# Patient Record
Sex: MALE | Race: WHITE
[De-identification: names, ages, dates, MRNs, and addresses within clinical notes are randomized per-mention and may not be internally consistent; named-entity substitution may affect disease eponyms.]

---

## 2021-06-14 ENCOUNTER — HOSPITAL ENCOUNTER (OUTPATIENT)
Dept: HOSPITAL 8 - CARD | Age: 76
Discharge: HOME | End: 2021-06-14
Attending: PSYCHIATRY & NEUROLOGY
Payer: MEDICARE

## 2021-06-14 DIAGNOSIS — G83.84: Primary | ICD-10-CM

## 2021-06-14 PROCEDURE — 95819 EEG AWAKE AND ASLEEP: CPT

## 2021-09-13 ENCOUNTER — HOSPITAL ENCOUNTER (OUTPATIENT)
Dept: HOSPITAL 8 - LAB | Age: 76
Discharge: HOME | End: 2021-09-13
Attending: INTERNAL MEDICINE
Payer: MEDICARE

## 2021-09-13 DIAGNOSIS — Z85.46: Primary | ICD-10-CM

## 2021-09-13 PROCEDURE — 84153 ASSAY OF PSA TOTAL: CPT

## 2021-09-13 PROCEDURE — 36415 COLL VENOUS BLD VENIPUNCTURE: CPT

## 2021-09-13 PROCEDURE — G0103 PSA SCREENING: HCPCS

## 2024-07-10 ENCOUNTER — APPOINTMENT (RX ONLY)
Dept: URBAN - METROPOLITAN AREA CLINIC 6 | Facility: CLINIC | Age: 79
Setting detail: DERMATOLOGY
End: 2024-07-10

## 2024-07-10 DIAGNOSIS — L57.0 ACTINIC KERATOSIS: ICD-10-CM

## 2024-07-10 DIAGNOSIS — L81.4 OTHER MELANIN HYPERPIGMENTATION: ICD-10-CM

## 2024-07-10 DIAGNOSIS — L05.91 PILONIDAL CYST WITHOUT ABSCESS: ICD-10-CM

## 2024-07-10 DIAGNOSIS — D22 MELANOCYTIC NEVI: ICD-10-CM

## 2024-07-10 DIAGNOSIS — L82.1 OTHER SEBORRHEIC KERATOSIS: ICD-10-CM

## 2024-07-10 DIAGNOSIS — D18.0 HEMANGIOMA: ICD-10-CM

## 2024-07-10 DIAGNOSIS — Z71.89 OTHER SPECIFIED COUNSELING: ICD-10-CM

## 2024-07-10 DIAGNOSIS — L85.3 XEROSIS CUTIS: ICD-10-CM

## 2024-07-10 PROBLEM — D22.5 MELANOCYTIC NEVI OF TRUNK: Status: ACTIVE | Noted: 2024-07-10

## 2024-07-10 PROBLEM — D18.01 HEMANGIOMA OF SKIN AND SUBCUTANEOUS TISSUE: Status: ACTIVE | Noted: 2024-07-10

## 2024-07-10 PROBLEM — D22.4 MELANOCYTIC NEVI OF SCALP AND NECK: Status: ACTIVE | Noted: 2024-07-10

## 2024-07-10 PROBLEM — D23.72 OTHER BENIGN NEOPLASM OF SKIN OF LEFT LOWER LIMB, INCLUDING HIP: Status: ACTIVE | Noted: 2024-07-10

## 2024-07-10 PROCEDURE — ? COUNSELING

## 2024-07-10 PROCEDURE — ? SUNSCREEN RECOMMENDATIONS

## 2024-07-10 PROCEDURE — 17000 DESTRUCT PREMALG LESION: CPT

## 2024-07-10 PROCEDURE — 99203 OFFICE O/P NEW LOW 30 MIN: CPT | Mod: 25

## 2024-07-10 PROCEDURE — ? PRESCRIPTION

## 2024-07-10 PROCEDURE — ? ADDITIONAL NOTES

## 2024-07-10 PROCEDURE — 17003 DESTRUCT PREMALG LES 2-14: CPT

## 2024-07-10 PROCEDURE — ? LIQUID NITROGEN

## 2024-07-10 RX ORDER — TRIAMCINOLONE ACETONIDE 1 MG/G
CREAM TOPICAL
Qty: 80 | Refills: 2 | Status: ERX | COMMUNITY
Start: 2024-07-10

## 2024-07-10 RX ADMIN — TRIAMCINOLONE ACETONIDE: 1 CREAM TOPICAL at 00:00

## 2024-07-10 ASSESSMENT — LOCATION SIMPLE DESCRIPTION DERM
LOCATION SIMPLE: POSTERIOR SCALP
LOCATION SIMPLE: LEFT LOWER BACK
LOCATION SIMPLE: LEFT UPPER BACK
LOCATION SIMPLE: UPPER BACK
LOCATION SIMPLE: LOWER BACK
LOCATION SIMPLE: SCALP
LOCATION SIMPLE: CHEST
LOCATION SIMPLE: LEFT FOREHEAD
LOCATION SIMPLE: ABDOMEN

## 2024-07-10 ASSESSMENT — LOCATION DETAILED DESCRIPTION DERM
LOCATION DETAILED: LEFT SUPERIOR PARIETAL SCALP
LOCATION DETAILED: LEFT SUPERIOR MEDIAL FOREHEAD
LOCATION DETAILED: RIGHT SUPERIOR PARIETAL SCALP
LOCATION DETAILED: LEFT RIB CAGE
LOCATION DETAILED: INFERIOR THORACIC SPINE
LOCATION DETAILED: LEFT CENTRAL PARIETAL SCALP
LOCATION DETAILED: LEFT SUPERIOR UPPER BACK
LOCATION DETAILED: POSTERIOR MID-PARIETAL SCALP
LOCATION DETAILED: INFERIOR LUMBAR SPINE
LOCATION DETAILED: LEFT INFERIOR MEDIAL MIDBACK
LOCATION DETAILED: LEFT LATERAL SUPERIOR CHEST

## 2024-07-10 ASSESSMENT — LOCATION ZONE DERM
LOCATION ZONE: TRUNK
LOCATION ZONE: FACE
LOCATION ZONE: SCALP

## 2024-07-10 NOTE — PROCEDURE: LIQUID NITROGEN
Show Aperture Variable?: Yes
Consent: The patient's verbal consent was obtained including but not limited to risks of crusting, scabbing, blistering, scarring, darker or lighter pigmentary change, recurrence, incomplete removal and infection.
Render Post-Care Instructions In Note?: no
Number Of Freeze-Thaw Cycles: 2 freeze-thaw cycles
Post-Care Instructions: I reviewed with the patient in detail post-care instructions. Patient is to wear sunprotection, and avoid picking at any of the treated lesions. Pt may apply Vaseline to crusted or scabbing areas.
Duration Of Freeze Thaw-Cycle (Seconds): 8
Detail Level: Zone

## 2024-07-10 NOTE — PROCEDURE: ADDITIONAL NOTES
Render Risk Assessment In Note?: no
Detail Level: Detailed
Additional Notes: Recommended frequent and liberal applications of a thick emollient such as CeraVe Moisturizing Cream with one application applied within 3-5 minutes of bathing.
Additional Notes: Discussed treatment options if it becomes bothersome/painful.

## 2025-01-23 ENCOUNTER — TELEPHONE (OUTPATIENT)
Dept: HEALTH INFORMATION MANAGEMENT | Facility: OTHER | Age: 80
End: 2025-01-23

## 2025-02-24 ENCOUNTER — OFFICE VISIT (OUTPATIENT)
Dept: MEDICAL GROUP | Facility: PHYSICIAN GROUP | Age: 80
End: 2025-02-24
Payer: MEDICARE

## 2025-02-24 VITALS
HEART RATE: 68 BPM | SYSTOLIC BLOOD PRESSURE: 118 MMHG | OXYGEN SATURATION: 94 % | BODY MASS INDEX: 29.73 KG/M2 | HEIGHT: 71 IN | DIASTOLIC BLOOD PRESSURE: 64 MMHG | WEIGHT: 212.4 LBS | TEMPERATURE: 97.9 F

## 2025-02-24 DIAGNOSIS — F13.20 SEDATIVE, HYPNOTIC OR ANXIOLYTIC DEPENDENCE, UNCOMPLICATED (HCC): ICD-10-CM

## 2025-02-24 DIAGNOSIS — E11.69 TYPE 2 DIABETES MELLITUS WITH OTHER SPECIFIED COMPLICATION, WITHOUT LONG-TERM CURRENT USE OF INSULIN (HCC): ICD-10-CM

## 2025-02-24 DIAGNOSIS — E66.9 OBESITY (BMI 30-39.9): ICD-10-CM

## 2025-02-24 PROBLEM — Z95.5 HISTORY OF CORONARY ARTERY STENT PLACEMENT: Status: ACTIVE | Noted: 2020-07-31

## 2025-02-24 PROBLEM — M51.369 DEGENERATION OF LUMBAR INTERVERTEBRAL DISC: Status: ACTIVE | Noted: 2020-07-21

## 2025-02-24 PROBLEM — M54.59 OTHER LOW BACK PAIN: Status: ACTIVE | Noted: 2025-02-24

## 2025-02-24 PROBLEM — M25.552 PAIN IN LEFT HIP: Status: ACTIVE | Noted: 2025-02-24

## 2025-02-24 PROBLEM — E78.5 HYPERLIPIDEMIA, UNSPECIFIED: Status: ACTIVE | Noted: 2025-02-24

## 2025-02-24 PROBLEM — M51.16 HERNIATION OF LUMBAR INTERVERTEBRAL DISC WITH RADICULOPATHY: Status: ACTIVE | Noted: 2020-07-21

## 2025-02-24 PROBLEM — R29.818 NEUROGENIC CLAUDICATION: Status: ACTIVE | Noted: 2020-07-21

## 2025-02-24 PROBLEM — E11.22 TYPE 2 DIABETES MELLITUS WITH DIABETIC CHRONIC KIDNEY DISEASE (HCC): Status: ACTIVE | Noted: 2025-02-24

## 2025-02-24 PROBLEM — F32.5 MAJOR DEPRESSION IN COMPLETE REMISSION (HCC): Status: ACTIVE | Noted: 2025-02-24

## 2025-02-24 PROBLEM — M10.9 GOUT, UNSPECIFIED: Chronic | Status: ACTIVE | Noted: 2021-10-05

## 2025-02-24 PROBLEM — I10 ESSENTIAL HYPERTENSION: Status: ACTIVE | Noted: 2018-05-09

## 2025-02-24 PROBLEM — I51.7 LEFT VENTRICULAR HYPERTROPHY: Chronic | Status: ACTIVE | Noted: 2021-10-05

## 2025-02-24 PROBLEM — E11.22 TYPE 2 DIABETES MELLITUS WITH DIABETIC CHRONIC KIDNEY DISEASE (HCC): Status: RESOLVED | Noted: 2025-02-24 | Resolved: 2025-02-24

## 2025-02-24 PROBLEM — M12.9 ARTHROPATHY: Status: ACTIVE | Noted: 2025-02-24

## 2025-02-24 PROBLEM — I11.9 HYPERTENSIVE HEART DISEASE: Chronic | Status: ACTIVE | Noted: 2022-03-31

## 2025-02-24 LAB
HBA1C MFR BLD: 6.9 % (ref ?–5.8)
POCT INT CON NEG: NEGATIVE
POCT INT CON POS: POSITIVE

## 2025-02-24 PROCEDURE — 3074F SYST BP LT 130 MM HG: CPT | Performed by: FAMILY MEDICINE

## 2025-02-24 PROCEDURE — 99204 OFFICE O/P NEW MOD 45 MIN: CPT | Performed by: FAMILY MEDICINE

## 2025-02-24 PROCEDURE — 3078F DIAST BP <80 MM HG: CPT | Performed by: FAMILY MEDICINE

## 2025-02-24 PROCEDURE — 83036 HEMOGLOBIN GLYCOSYLATED A1C: CPT | Performed by: FAMILY MEDICINE

## 2025-02-24 RX ORDER — SITAGLIPTIN 50 MG/1
50 TABLET ORAL
COMMUNITY
Start: 2025-01-16

## 2025-02-24 RX ORDER — ACETAMINOPHEN 500 MG
2 TABLET ORAL 3 TIMES DAILY
COMMUNITY
Start: 2025-01-03

## 2025-02-24 RX ORDER — CYCLOBENZAPRINE HCL 10 MG
TABLET ORAL
COMMUNITY

## 2025-02-24 RX ORDER — ALPRAZOLAM 0.5 MG
0.5 TABLET ORAL
COMMUNITY
Start: 2025-01-06

## 2025-02-24 RX ORDER — GLIPIZIDE 10 MG/1
TABLET ORAL
COMMUNITY

## 2025-02-24 RX ORDER — ATORVASTATIN CALCIUM 40 MG/1
TABLET, FILM COATED ORAL
COMMUNITY

## 2025-02-24 RX ORDER — NITROGLYCERIN 0.4 MG/1
0.4 TABLET SUBLINGUAL PRN
COMMUNITY

## 2025-02-24 RX ORDER — ALLOPURINOL 300 MG/1
TABLET ORAL
COMMUNITY

## 2025-02-24 RX ORDER — ASPIRIN 81 MG/1
81 TABLET ORAL DAILY
COMMUNITY

## 2025-02-24 RX ORDER — LISINOPRIL 5 MG/1
5 TABLET ORAL DAILY
COMMUNITY
End: 2025-02-24

## 2025-02-24 RX ORDER — CHLORAL HYDRATE 500 MG
1 CAPSULE ORAL
COMMUNITY

## 2025-02-24 RX ORDER — LISINOPRIL 20 MG/1
40 TABLET ORAL DAILY
COMMUNITY

## 2025-02-24 RX ORDER — CARBIDOPA AND LEVODOPA 25; 100 MG/1; MG/1
1 TABLET ORAL 3 TIMES DAILY
COMMUNITY

## 2025-02-24 RX ORDER — LIDOCAINE 50 MG/G
PATCH TOPICAL
COMMUNITY
Start: 2024-11-06

## 2025-02-24 RX ORDER — VENLAFAXINE HYDROCHLORIDE 75 MG/1
75 CAPSULE, EXTENDED RELEASE ORAL
COMMUNITY
Start: 2024-10-16

## 2025-02-24 RX ORDER — MELOXICAM 15 MG/1
15 TABLET ORAL
COMMUNITY
Start: 2024-11-06

## 2025-02-24 RX ORDER — METOPROLOL TARTRATE 25 MG/1
25 TABLET, FILM COATED ORAL 2 TIMES DAILY
COMMUNITY

## 2025-02-24 RX ORDER — CALCIUM CARBONATE 500 MG/1
500 TABLET, CHEWABLE ORAL
COMMUNITY

## 2025-02-24 ASSESSMENT — PATIENT HEALTH QUESTIONNAIRE - PHQ9: CLINICAL INTERPRETATION OF PHQ2 SCORE: 0

## 2025-02-24 ASSESSMENT — FIBROSIS 4 INDEX: FIB4 SCORE: 1.71

## 2025-02-24 NOTE — PROGRESS NOTES
Verbal consent was acquired by the patient to use [employer] ambient listening note generation during this visit.    Subjective:     HPI:   History of Present Illness  The patient is a 79-year-old male presenting for evaluation of diabetes mellitus, peripheral neuropathy, restless leg syndrome, renal cyst, chronic back pain, anxiety, onychodystrophy, and general health maintenance.    The patient has been under the care of [facility] for several years and [facility]. His last hemoglobin A1c, measured approximately 6 months ago, was 7.8%. He is currently managed on Jardiance and metformin for diabetes mellitus and has never required insulin or injectable therapies outside of hospital settings. He is under the care of an endocrinologist. The patient reports a weight loss of 40 pounds and adherence to a sugar-free diet. He undergoes annual ophthalmologic examinations, with the most recent conducted 9 months ago, and reports no changes in vision. He believes a urine protein study was performed within the past year. He has not had a podiatric examination in the past 5 years.    The patient has experienced peripheral neuropathy in both feet for the past 20 years, which he attributes to diabetes mellitus. He reports numbness without pain. He has observed a sudden change in color to blue of the toenail adjacent to the hallux, which subsequently detached this morning. He denies any trauma to the area. He reports no ankle edema but notes that his feet feel constricted in his shoes by late afternoon.    He is on carbidopa-levodopa for restless leg syndrome and is aware of its association with Parkinson's disease, although he has not been diagnosed with Parkinson's disease. He is not on pramipexole.    The patient has been diagnosed with a [facility] and has been on Phenergan for the past 6 months.    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 He has been using a cane for mobility for the past few years and finds relief from acetaminophen, which he takes every 8 hours. He also takes meloxicam and cyclobenzaprine nightly. He occasionally uses lidocaine patches and carries nitroglycerin at all times. Despite having 6 stents placed, with the most recent in 2015, he is not on clopidogrel. He is under the care of a cardiologist and is not on any opioids. He is on lisinopril for hypertension management and takes low-dose aspirin. He is not on antibiotics, gabapentin, or other neuropathic pain medications.    The patient takes venlafaxine once daily in the morning and alprazolam 0.5 mg at night, [facility] by [facility]. He has been on alprazolam since 1999, initially at a dose of 0.25 mg, which was later increased to 0.5 mg.    He is uncertain if he received his influenza vaccine this year but typically receives it [facility] through [facility]. He is up to date on his tetanus vaccination. He has an upcoming appointment with his [facility] this week. He has an echocardiogram scheduled for tomorrow and a dental appointment before his surgery with Dr. Soriano on 03/28/2025. [facility] also [facility].    SOCIAL HISTORY  He does not smoke. He does not drink alcohol.    MEDICATIONS  Current: Jardiance, metformin, carbidopa-levodopa, meloxicam, acetaminophen, cyclobenzaprine, aspirin, Xanax, venlafaxine, nitroglycerin, lisinopril, sitagliptin, multivitamin, lidocaine patches.  Discontinued: Pramipexole, glipizide, clopidogrel, gabapentin.    IMMUNIZATIONS  He is unsure if he received his influenza vaccine this year but typically gets it [facility]. He is up to date on his tetanus vaccine.    Health Maintenance: Completed    Objective:     Exam:  /64 (BP Location: Left arm, Patient Position: Sitting, BP Cuff Size: Adult)   Pulse 68   Temp 36.6 °C (97.9 °F) (Temporal)    "Ht 1.791 m (5' 10.5\")   Wt 96.3 kg (212 lb 6.4 oz)   SpO2 94%   BMI 30.05 kg/m²  Body mass index is 30.05 kg/m².    Physical Exam  Vitals reviewed.   Constitutional:       Appearance: Normal appearance.   HENT:      Head: Normocephalic and atraumatic.      Nose: Nose normal.   Cardiovascular:      Rate and Rhythm: Normal rate and regular rhythm.      Pulses: Normal pulses.      Heart sounds: Normal heart sounds. No murmur heard.  Pulmonary:      Effort: Pulmonary effort is normal.      Breath sounds: Normal breath sounds.   Abdominal:      General: Abdomen is flat.      Palpations: Abdomen is soft.   Skin:     General: Skin is warm and dry.   Neurological:      Mental Status: He is alert.   Psychiatric:         Mood and Affect: Mood normal.         Behavior: Behavior normal.             Results  Laboratory Studies  A1c was close to 8 six months ago.    Assessment & Plan:     1. Sedative, hypnotic or anxiolytic dependence, uncomplicated ([facility])        2. Type 2 diabetes mellitus with other specified complication, without long-term current use of insulin ([facility])  POCT  A1C    Diabetic Monofilament LE Exam      3. Obesity (BMI 30-39.9)  Patient identified as having weight management issue.  Appropriate orders and counseling given.          Assessment & Plan  1. Diabetes Mellitus.  His A1c levels have been fluctuating between 7 and 8 for an extended period. He is currently on Jardiance (empagliflozin) and metformin. He is also on sitagliptin 50 mg. He is advised to continue his current medication regimen and maintain a sugar-free diet. An updated A1c test will be conducted today. He is advised to monitor his blood sugar levels every 3 to 6 months. He is also advised to continue his annual ophthalmology visits due to the potential for diabetes-related eye changes. If his A1c levels exceed 8, the introduction of injectable medications may be considered.    2. Neuropathy.  He has been experiencing neuropathy in both feet " for 20 years, likely due to diabetes. He is not currently on any medication for neuropathy. Gabapentin was discussed as a potential treatment option, but he does not experience significant pain, only numbness. He is advised to continue monitoring his symptoms and report any changes.    3. Restless Leg Syndrome.  He is currently on carbidopa-levodopa for restless leg syndrome. He is not on pramipexole (Mirapex). He is advised to continue his current medication regimen.    4. Renal Cyst.  He has a small cyst on his kidney, as [facility]. He is not experiencing any pain and is not on any specific medication for it.    5. Back Pain.  He is scheduled for a third back surgery due to arthritis and other issues. He is currently taking acetaminophen and meloxicam for pain management. He also takes cyclobenzaprine (Flexeril) every night. He is advised to continue his current pain management regimen and follow up with his spine surgeon, Dr. Soriano, at [facility].    6. Anxiety.  He is taking venlafaxine once daily in the morning and Xanax 0.5 mg at night, [facility] by [facility]. He is advised to continue his current medication regimen.    7. Toenail Issue.  He reported that his toenail turned blue and eventually fell off, likely due to blood accumulation underneath the nail. He is advised that it will take about a year for the nail to grow back.    8. Health Maintenance.  He is up to date on his tetanus vaccine. He is unsure if he received his flu shot this year but usually gets it [facility]. He is advised to get his flu shot annually.    PROCEDURE  The patient had 6 stents placed previously, with the most recent one in 2015.    HCC Gap Form    Diagnosis to address: F13.20 - Sedative, hypnotic or anxiolytic dependence, uncomplicated (HCC)  Assessment and plan: Chronic, stable. Continue with current defined treatment plan: Alprazolam 0.5 mg nightly.  [facility].  Recommend continued follow-up at least  every 3 months.    Diagnosis: E11.69 - Type 2 diabetes mellitus with other specified complication (HCC)  Assessment and plan: Chronic, stable. Continue with current defined treatment plan: Jardiance-metformin  mg tablet daily as well as a Shane 50 mg tablet daily.  Continue regular diabetic screening at least annually with A1c's every 3 to 6 months.  Patient does follow [facility].  Records requested. Follow-up at least annually.  Last edited 02/24/25 11:02 PST by Anais Bach M.D.         Return in about 6 months (around 8/24/2025) for Med check.    Please note that this dictation was created using voice recognition software. I have made every reasonable attempt to correct obvious errors, but I expect that there are errors of grammar and possibly content that I did not discover before finalizing the note.    Anais Bach MD  Family Medicine and Non - Operative Sports Medicine   [facility] [place]

## 2025-02-24 NOTE — LETTER
[facility]  Anais Bach M.D.  [address]  [place] [address]  Fax: [phone]   Authorization for Release/Disclosure of   Protected Health Information   Name: LOBO SOLORIO : 1945 SSN: [id number]   Address: [address]  [place] [address] Phone:    [phone] (home)    I authorize the entity listed below to release/disclose the PHI below to:   [facility]/Anais Bach M.D. and Anais Bach M.D.   Provider or Entity Name:     Address   City, State, Zip   Phone:      Fax:     Reason for request: continuity of care   Information to be released:    [  ] LAST COLONOSCOPY,  including any PATH REPORT and follow-up  [  ] LAST FIT/COLOGUARD RESULT [  ] LAST DEXA  [  ] LAST MAMMOGRAM  [  ] LAST PAP  [  ] LAST LABS [  ] RETINA EXAM REPORT  [  ] IMMUNIZATION RECORDS  [  ] Release all info      [  ] Check here and initial the line next to each item to release ALL health information INCLUDING  _____ Care and treatment for drug and / or alcohol abuse  _____ HIV testing, infection status, or AIDS  _____ Genetic Testing    DATES OF SERVICE OR TIME PERIOD TO BE DISCLOSED: _____________  I understand and acknowledge that:  * This Authorization may be revoked at any time by you in writing, except if your health information has already been used or disclosed.  * Your health information that will be used or disclosed as a result of you signing this authorization could be re-disclosed by the recipient. If this occurs, your re-disclosed health information may no longer be protected by State or Federal laws.  * You may refuse to sign this Authorization. Your refusal will not affect your ability to obtain treatment.  * This Authorization becomes effective upon signing and will  on (date) __________.      If no date is indicated, this Authorization will  one (1) year from the signature date.    Name: Lobo Solorio  Signature: Date:   2025     PLEASE FAX REQUESTED RECORDS BACK TO: [phone]  [phone]

## 2025-03-24 ENCOUNTER — APPOINTMENT (OUTPATIENT)
Dept: CARDIOLOGY | Facility: MEDICAL CENTER | Age: 80
End: 2025-03-24
Attending: INTERNAL MEDICINE
Payer: MEDICARE

## 2025-04-11 ENCOUNTER — HOSPITAL ENCOUNTER (OUTPATIENT)
Dept: CARDIOLOGY | Facility: MEDICAL CENTER | Age: 80
End: 2025-04-11
Attending: INTERNAL MEDICINE
Payer: MEDICARE

## 2025-04-11 DIAGNOSIS — I25.10 ASCVD (ARTERIOSCLEROTIC CARDIOVASCULAR DISEASE): ICD-10-CM

## 2025-04-11 LAB
LV EJECT FRACT  99904: 56
LV EJECT FRACT MOD 2C 99903: 55.86
LV EJECT FRACT MOD 4C 99902: 59.43
LV EJECT FRACT MOD BP 99901: 55.98

## 2025-04-11 PROCEDURE — 93306 TTE W/DOPPLER COMPLETE: CPT | Mod: 26 | Performed by: INTERNAL MEDICINE

## 2025-04-11 PROCEDURE — 93306 TTE W/DOPPLER COMPLETE: CPT

## 2025-06-02 NOTE — TELEPHONE ENCOUNTER
Message: Called and left message for patient to schedule annual PATY Visit with [facility]. Left phone number for patient to call [facility] at [phone] to schedule.

## 2025-08-25 ENCOUNTER — OFFICE VISIT (OUTPATIENT)
Dept: MEDICAL GROUP | Facility: PHYSICIAN GROUP | Age: 80
End: 2025-08-25
Payer: MEDICARE

## 2025-08-25 VITALS
SYSTOLIC BLOOD PRESSURE: 126 MMHG | RESPIRATION RATE: 14 BRPM | DIASTOLIC BLOOD PRESSURE: 70 MMHG | HEART RATE: 68 BPM | BODY MASS INDEX: 29.34 KG/M2 | OXYGEN SATURATION: 93 % | TEMPERATURE: 97 F | WEIGHT: 209.6 LBS | HEIGHT: 71 IN

## 2025-08-25 DIAGNOSIS — E66.9 OBESITY (BMI 30-39.9): Primary | ICD-10-CM

## 2025-08-25 DIAGNOSIS — Z12.5 ENCOUNTER FOR SCREENING FOR MALIGNANT NEOPLASM OF PROSTATE: ICD-10-CM

## 2025-08-25 DIAGNOSIS — E11.69 TYPE 2 DIABETES MELLITUS WITH OTHER SPECIFIED COMPLICATION, WITHOUT LONG-TERM CURRENT USE OF INSULIN (HCC): ICD-10-CM

## 2025-08-25 DIAGNOSIS — M51.370 DEGENERATION OF INTERVERTEBRAL DISC OF LUMBOSACRAL REGION WITH DISCOGENIC BACK PAIN: ICD-10-CM

## 2025-08-25 DIAGNOSIS — Z11.59 NEED FOR HEPATITIS C SCREENING TEST: ICD-10-CM

## 2025-08-25 DIAGNOSIS — Z00.00 HEALTHCARE MAINTENANCE: ICD-10-CM

## 2025-08-25 PROBLEM — L05.92 PILONIDAL SINUS: Status: ACTIVE | Noted: 2021-10-05

## 2025-08-25 PROBLEM — F33.9 MAJOR DEPRESSION, RECURRENT, CHRONIC (HCC): Chronic | Status: ACTIVE | Noted: 2022-10-10

## 2025-08-25 PROBLEM — I45.10 RBBB: Status: ACTIVE | Noted: 2020-07-31

## 2025-08-25 PROBLEM — F43.12 POST-TRAUMATIC STRESS DISORDER, CHRONIC: Status: ACTIVE | Noted: 2025-08-25

## 2025-08-25 PROBLEM — M48.00 SPINAL STENOSIS: Status: ACTIVE | Noted: 2024-09-24

## 2025-08-25 PROBLEM — R80.9 NEPHROTIC RANGE PROTEINURIA: Status: ACTIVE | Noted: 2023-11-13

## 2025-08-25 PROBLEM — Z85.46 PERSONAL HISTORY OF MALIGNANT NEOPLASM OF PROSTATE: Status: ACTIVE | Noted: 2021-10-05

## 2025-08-25 PROBLEM — F43.9 REACTION TO SEVERE STRESS, UNSPECIFIED: Status: ACTIVE | Noted: 2025-08-25

## 2025-08-25 PROBLEM — E53.8 VITAMIN B 12 DEFICIENCY: Chronic | Status: ACTIVE | Noted: 2021-10-05

## 2025-08-25 PROBLEM — C61 PROSTATE CANCER (HCC): Status: ACTIVE | Noted: 2025-08-25

## 2025-08-25 PROBLEM — N18.30 DIABETES MELLITUS WITH STAGE 3 CHRONIC KIDNEY DISEASE (HCC): Status: ACTIVE | Noted: 2021-10-05

## 2025-08-25 PROBLEM — N18.31 STAGE 3A CHRONIC KIDNEY DISEASE: Status: ACTIVE | Noted: 2022-10-10

## 2025-08-25 PROBLEM — G83.84 TODD'S PARESIS (HCC): Status: ACTIVE | Noted: 2021-04-05

## 2025-08-25 PROBLEM — E11.22 DIABETES MELLITUS WITH STAGE 3 CHRONIC KIDNEY DISEASE (HCC): Status: ACTIVE | Noted: 2021-10-05

## 2025-08-25 LAB
HBA1C MFR BLD: 7.1 % (ref ?–5.8)
POCT INT CON NEG: NEGATIVE
POCT INT CON POS: POSITIVE

## 2025-08-25 PROCEDURE — 92250 FUNDUS PHOTOGRAPHY W/I&R: CPT | Mod: 26 | Performed by: FAMILY MEDICINE

## 2025-08-25 PROCEDURE — 3074F SYST BP LT 130 MM HG: CPT | Performed by: FAMILY MEDICINE

## 2025-08-25 PROCEDURE — 3078F DIAST BP <80 MM HG: CPT | Performed by: FAMILY MEDICINE

## 2025-08-25 PROCEDURE — 99214 OFFICE O/P EST MOD 30 MIN: CPT | Performed by: FAMILY MEDICINE

## 2025-08-25 PROCEDURE — 83036 HEMOGLOBIN GLYCOSYLATED A1C: CPT | Performed by: FAMILY MEDICINE

## 2025-08-25 RX ORDER — SODIUM BICARBONATE 650 MG/1
650 TABLET ORAL NIGHTLY
COMMUNITY
Start: 2025-04-16

## 2025-08-25 RX ORDER — LISINOPRIL 40 MG/1
40 TABLET ORAL DAILY
COMMUNITY

## 2025-08-25 ASSESSMENT — PATIENT HEALTH QUESTIONNAIRE - PHQ9
1. LITTLE INTEREST OR PLEASURE IN DOING THINGS: NOT AT ALL
4. FEELING TIRED OR HAVING LITTLE ENERGY: NOT AT ALL
6. FEELING BAD ABOUT YOURSELF - OR THAT YOU ARE A FAILURE OR HAVE LET YOURSELF OR YOUR FAMILY DOWN: NOT AL ALL
SUM OF ALL RESPONSES TO PHQ QUESTIONS 1-9: 0
9. THOUGHTS THAT YOU WOULD BE BETTER OFF DEAD, OR OF HURTING YOURSELF: NOT AT ALL
5. POOR APPETITE OR OVEREATING: NOT AT ALL
7. TROUBLE CONCENTRATING ON THINGS, SUCH AS READING THE NEWSPAPER OR WATCHING TELEVISION: NOT AT ALL
3. TROUBLE FALLING OR STAYING ASLEEP OR SLEEPING TOO MUCH: NOT AT ALL
2. FEELING DOWN, DEPRESSED, IRRITABLE, OR HOPELESS: NOT AT ALL
8. MOVING OR SPEAKING SO SLOWLY THAT OTHER PEOPLE COULD HAVE NOTICED. OR THE OPPOSITE, BEING SO FIGETY OR RESTLESS THAT YOU HAVE BEEN MOVING AROUND A LOT MORE THAN USUAL: NOT AT ALL
SUM OF ALL RESPONSES TO PHQ9 QUESTIONS 1 AND 2: 0

## 2025-08-28 ENCOUNTER — RESULTS FOLLOW-UP (OUTPATIENT)
Dept: MEDICAL GROUP | Facility: PHYSICIAN GROUP | Age: 80
End: 2025-08-28
Payer: MEDICARE

## 2025-08-28 ENCOUNTER — TELEPHONE (OUTPATIENT)
Dept: MEDICAL GROUP | Facility: PHYSICIAN GROUP | Age: 80
End: 2025-08-28
Payer: MEDICARE

## 2025-08-28 LAB — RETINAL SCREEN: NEGATIVE
